# Patient Record
Sex: MALE | Race: WHITE | Employment: OTHER | URBAN - METROPOLITAN AREA
[De-identification: names, ages, dates, MRNs, and addresses within clinical notes are randomized per-mention and may not be internally consistent; named-entity substitution may affect disease eponyms.]

---

## 2017-07-11 ENCOUNTER — HOSPITAL ENCOUNTER (OUTPATIENT)
Dept: CT IMAGING | Age: 67
Discharge: HOME | End: 2017-07-11
Attending: UROLOGY
Payer: MEDICARE

## 2017-07-11 DIAGNOSIS — N20.0 KIDNEY STONE: ICD-10-CM

## 2017-07-11 PROCEDURE — 74176 CT ABD & PELVIS W/O CONTRAST: CPT

## 2018-08-01 ENCOUNTER — CLINICAL SUPPORT (OUTPATIENT)
Dept: CARDIOLOGY CLINIC | Age: 68
End: 2018-08-01

## 2018-08-01 ENCOUNTER — OFFICE VISIT (OUTPATIENT)
Dept: CARDIOLOGY CLINIC | Age: 68
End: 2018-08-01

## 2018-08-01 VITALS
HEART RATE: 73 BPM | WEIGHT: 250.9 LBS | OXYGEN SATURATION: 96 % | DIASTOLIC BLOOD PRESSURE: 80 MMHG | SYSTOLIC BLOOD PRESSURE: 138 MMHG | HEIGHT: 69 IN | RESPIRATION RATE: 16 BRPM | BODY MASS INDEX: 37.16 KG/M2

## 2018-08-01 DIAGNOSIS — R94.31 ABNORMAL EKG: ICD-10-CM

## 2018-08-01 DIAGNOSIS — I49.1 PAC (PREMATURE ATRIAL CONTRACTION): Primary | ICD-10-CM

## 2018-08-01 PROBLEM — E66.01 SEVERE OBESITY (BMI 35.0-39.9): Status: ACTIVE | Noted: 2018-08-01

## 2018-08-01 RX ORDER — LEVOTHYROXINE SODIUM 75 UG/1
TABLET ORAL
COMMUNITY

## 2018-08-01 RX ORDER — LISINOPRIL AND HYDROCHLOROTHIAZIDE 10; 12.5 MG/1; MG/1
TABLET ORAL DAILY
COMMUNITY

## 2018-08-01 RX ORDER — ASPIRIN 81 MG/1
TABLET ORAL DAILY
COMMUNITY
End: 2022-04-06 | Stop reason: CLARIF

## 2018-08-01 NOTE — PROGRESS NOTES
Royce Daily DNP, ANP-BC Subjective/HPI:  
 
Deepak Hood is a 79 y.o. male is here for the patient consultation regarding abnormal EKG. During general physical exam patient was appreciated to have irregularity in his heart rhythm, EKG performed by primary care showing sinus rhythm with frequent PACs. Patient is asymptomatic, denies lightheadedness dizziness shortness of breath palpitations. There is a family history of valve replacement, pacemaker placement and MI. 
 
 
PCP Provider Ian Espinosa MD 
Past Medical History:  
Diagnosis Date  Essential hypertension  Thyroid disease History reviewed. No pertinent surgical history. No Known Allergies Family History Problem Relation Age of Onset  Arrhythmia Mother  Pacemaker Mother  Cancer Father  Heart Attack Brother  Cancer Brother  Stroke Brother Current Outpatient Prescriptions Medication Sig  levothyroxine (SYNTHROID) 75 mcg tablet Take  by mouth Daily (before breakfast).  lisinopril-hydroCHLOROthiazide (PRINZIDE, ZESTORETIC) 10-12.5 mg per tablet Take  by mouth daily.  aspirin delayed-release 81 mg tablet Take  by mouth daily. No current facility-administered medications for this visit. Vitals:  
 08/01/18 1056 08/01/18 1107 BP: 130/78 138/80 Pulse: 73 Resp: 16 SpO2: 96% Weight: 250 lb 14.4 oz (113.8 kg) Height: 5' 9\" (1.753 m) Social History Social History  Marital status:  Spouse name: N/A  
 Number of children: N/A  
 Years of education: N/A Occupational History  Not on file. Social History Main Topics  Smoking status: Never Smoker  Smokeless tobacco: Current User Types: Chew Comment: tobacco chew  Alcohol use No  
 Drug use: No  
 Sexual activity: Not on file Other Topics Concern  Not on file Social History Narrative  No narrative on file I have reviewed the nurses notes, vitals, problem list, allergy list, medical history, family, social history and medications. Review of Symptoms: 
 
General: Pt denies excessive weight gain or loss. Pt is able to conduct ADL's HEENT: Denies blurred vision, headaches, epistaxis and difficulty swallowing. Respiratory: Denies shortness of breath, MANDEL, wheezing or stridor. Cardiovascular: Denies precordial pain, palpitations, edema or PND Gastrointestinal: Denies poor appetite, indigestion, abdominal pain or blood in stool Musculoskeletal: Denies pain or swelling from muscles or joints Neurologic: Denies tremor, paresthesias, or sensory motor disturbance Skin: Denies rash, itching or texture change. Physical Exam:   
 
General: Well developed, in no acute distress, cooperative and alert HEENT: No carotid bruits, no JVD, trach is midline. Neck Supple, PEERL, EOM intact. Heart:  Normal S1/S2 negative S3 or S4. Regular, no murmur, gallop or rub.  
Respiratory: Clear bilaterally x 4, no wheezing or rales Abdomen:   Soft, non-tender, no masses, bowel sounds are active.  
Extremities:  No edema, normal cap refill, no cyanosis, atraumatic. Neuro: A&Ox3, speech clear, gait stable. Skin: Skin color is normal. No rashes or lesions. Non diaphoretic Vascular: 2+ pulses symmetric in all extremities Cardiographics ECG: nsr with pac's No results found for this or any previous visit. Cardiology Labs: 
No results found for: CHOL, CHOLX, [facility], [address], [address], HDL, LDL, LDLC, DLDLP, TGLX, TRIGL, TRIGP, CHHD, CHHDX No results found for: NA, K, CL, CO2, AGAP, GLU, BUN, CREA, BUCR, GFRAA, GFRNA, CA, TBIL, TBILI, GPT, SGOT, AP, TP, ALB, GLOB, AGRAT, ALT Assessment: 
 
 Assessment:  
 
Diagnoses and all orders for this visit: 1. PAC (premature atrial contraction) 2. Abnormal EKG 
-     AMB POC EKG ROUTINE W/ 12 LEADS, INTER & REP 
 
 
  ICD-10-CM ICD-9-CM 1. PAC (premature atrial contraction) I49.1 427.61   
2.  Abnormal EKG R94.31 794.31 AMB POC EKG ROUTINE W/ 12 LEADS, INTER & REP Orders Placed This Encounter  AMB POC EKG ROUTINE W/ 12 LEADS, INTER & REP Order Specific Question:   Reason for Exam: Answer:   routine  levothyroxine (SYNTHROID) 75 mcg tablet Sig: Take  by mouth Daily (before breakfast).  lisinopril-hydroCHLOROthiazide (PRINZIDE, ZESTORETIC) 10-12.5 mg per tablet Sig: Take  by mouth daily.  aspirin delayed-release 81 mg tablet Sig: Take  by mouth daily. Plan:  
 
Patient is a 68-year-old male referred by primary care for abnormal ECG showing premature atrial contractions. Patient is asymptomatic. Will evaluate with Holter monitor to rule out PAF, echocardiogram.  
Hypertension: Controlled continue current medication Follow-up when testing complete. This note was created using voice recognition software. Despite editing, there may be syntax errors.   
 
Collette Barton MD

## 2018-08-01 NOTE — PROGRESS NOTES
Chief Complaint Patient presents with  New Patient  Abnormal EKG  
  per PCP 1. Have you been to the ER, urgent care clinic since your last visit? Hospitalized since your last visit? No 
 
2. Have you seen or consulted any other health care providers outside of the [address] [place] [address] since your last visit? Include any pap smears or colon screening. Yes When: pcp

## 2018-08-02 ENCOUNTER — CLINICAL SUPPORT (OUTPATIENT)
Dept: CARDIOLOGY CLINIC | Age: 68
End: 2018-08-02

## 2018-08-02 DIAGNOSIS — I49.1 PAC (PREMATURE ATRIAL CONTRACTION): ICD-10-CM

## 2018-08-02 DIAGNOSIS — I10 ESSENTIAL HYPERTENSION: Primary | ICD-10-CM

## 2018-08-07 ENCOUNTER — TELEPHONE (OUTPATIENT)
Dept: CARDIOLOGY CLINIC | Age: 68
End: 2018-08-07

## 2018-08-08 NOTE — TELEPHONE ENCOUNTER
Felisha Jurado, NP   You 20 hours ago (4:38 PM)                 ECHO normal  (Routing comment)                     Spoke with patient's spouse Lawanda(verified HIPPA)  Verified patient with 2 patient identifiers  Informed per Em Shipman NP ECHO normal

## 2018-08-15 ENCOUNTER — TELEPHONE (OUTPATIENT)
Dept: CARDIOLOGY CLINIC | Age: 68
End: 2018-08-15

## 2018-08-15 NOTE — PROGRESS NOTES
Spoke with Lawanda(verified HIPPA)  Verified patient with 2 patient identifiers  Informed ECHO ok per Dr Phnog Yap verbalized understanding

## 2018-08-21 ENCOUNTER — OFFICE VISIT (OUTPATIENT)
Dept: CARDIOLOGY CLINIC | Age: 68
End: 2018-08-21

## 2018-08-21 VITALS
WEIGHT: 241 LBS | HEART RATE: 59 BPM | RESPIRATION RATE: 18 BRPM | DIASTOLIC BLOOD PRESSURE: 88 MMHG | OXYGEN SATURATION: 94 % | BODY MASS INDEX: 35.7 KG/M2 | HEIGHT: 69 IN | SYSTOLIC BLOOD PRESSURE: 124 MMHG

## 2018-08-21 DIAGNOSIS — I10 ESSENTIAL HYPERTENSION: Primary | ICD-10-CM

## 2018-08-21 DIAGNOSIS — I49.1 PAC (PREMATURE ATRIAL CONTRACTION): ICD-10-CM

## 2018-08-21 NOTE — PROGRESS NOTES
1. Have you been to the ER, urgent care clinic since your last visit? Hospitalized since your last visit? NO.    2. Have you seen or consulted any other health care providers outside of the [address] [place] [address] since your last visit? Include any pap smears or colon screening. NO.       Chief Complaint   Patient presents with    Results     PT DENIES ANY CARDIAC SYMPTOMS

## 2018-08-21 NOTE — PROGRESS NOTES
Subjective/HPI:     Samir Cat is a 79 y.o. male. He was last seen one month ago. Today, Mr. Dario Olmos states he is doing well. 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 He has lost 9 lbs since his last appointment. He denies any dizziness. He further denies any exertional chest pain, dyspnea, palpitations, syncope, orthopnea, edema or paroxysmal nocturnal dyspnea. Of note, he reports a family history of valve replacement, pacemaker placement and MI.    PCP Provider  Meryle Motto, MD  Past Medical History:   Diagnosis Date    Essential hypertension     Thyroid disease       No past surgical history on file. No Known Allergies   Family History   Problem Relation Age of Onset    Arrhythmia Mother     Pacemaker Mother     Cancer Father     Heart Attack Brother     Cancer Brother     Stroke Brother       Current Outpatient Prescriptions   Medication Sig    fish oil-omega-3 fatty acids (FISH OIL) 300-500 mg cap Take  by mouth daily.  levothyroxine (SYNTHROID) 75 mcg tablet Take  by mouth Daily (before breakfast).  lisinopril-hydroCHLOROthiazide (PRINZIDE, ZESTORETIC) 10-12.5 mg per tablet Take  by mouth daily.  aspirin delayed-release 81 mg tablet Take  by mouth daily. No current facility-administered medications for this visit. Vitals:    08/21/18 1111   BP: 124/88   Pulse: (!) 59   Resp: 18   SpO2: 94%   Weight: 241 lb (109.3 kg)   Height: 5' 9\" (1.753 m)     Social History     Social History    Marital status:      Spouse name: N/A    Number of children: N/A    Years of education: N/A     Occupational History    Not on file.      Social History Main Topics    Smoking status: Never Smoker    Smokeless tobacco: Current User     Types: Chew      Comment: tobacco chew    Alcohol use No    Drug use: No    Sexual activity: Not on file     Other Topics Concern    Not on file     Social History Narrative       I have reviewed the nurses notes, vitals, problem list, allergy list, medical history, family, social history and medications. Review of Symptoms:    General: Pt denies excessive weight gain or loss. Pt is able to conduct ADL's  HEENT: Denies blurred vision, headaches, epistaxis and difficulty swallowing. Respiratory: Denies shortness of breath, MANDEL, wheezing or stridor. Cardiovascular: Denies precordial pain, palpitations, edema or PND  Gastrointestinal: Denies poor appetite, indigestion, abdominal pain or blood in stool  Musculoskeletal: Denies pain or swelling from muscles or joints  Neurologic: Denies tremor, paresthesias, or sensory motor disturbance  Skin: Denies rash, itching or texture change. Physical Exam:      General: Well developed, in no acute distress, cooperative and alert  HEENT: No carotid bruits, no JVD, trach is midline. Neck Supple, PEERL, EOM intact. Heart:  Normal S1/S2 negative S3 or S4. Regular, no murmur, gallop or rub.   Respiratory: Clear bilaterally x 4, no wheezing or rales  Abdomen:   Soft, non-tender, no masses, bowel sounds are active.   Extremities:  No edema, normal cap refill, no cyanosis, atraumatic. Neuro: A&Ox3, speech clear, gait stable. Skin: Skin color is normal. No rashes or lesions.  Non diaphoretic  Vascular: 2+ pulses symmetric in all extremities    Cardiographics      Results for orders placed or performed in visit on 08/01/18   CARDIAC HOLTER MONITOR, 24 HOURS    Narrative    ECG Monitor/24 hours, Complete    Reason for Holter Monitor   BRADYCARDIA/ PAC'S    Heartbeat    Slowest 41  Average 55  Fastest  74      Results:   Underlying Rhythm: Sinus bradycardia      Atrial Arrhythmias: premature atrial contractions; occasional, severe bradycardia          AV Conduction: normal    Ventricular Arrhythmias: premature ventricular contractions; rare     ST Segment Analysis:normal     Symptom Correlation:  No symptoms reported    Comment:   Sinus bradycardia, occasional pacs, no symptoms reported. Consider treadmill test to r/o chronotropic incompetence     Yu Motley MD, Basil Sam, [facility]                Cardiology Labs:  No results found for: CHOL, CHOLX, CHLST, CHOLV, 242431, HDL, LDL, LDLC, DLDLP, TGLX, TRIGL, TRIGP, CHHD, CHHDX    No results found for: NA, K, CL, CO2, AGAP, GLU, BUN, CREA, BUCR, GFRAA, GFRNA, CA, TBIL, TBILI, GPT, SGOT, AP, TP, ALB, GLOB, AGRAT, ALT        Assessment:     Assessment:     Diagnoses and all orders for this visit:    1. Essential hypertension    2. PAC (premature atrial contraction)        ICD-10-CM ICD-9-CM    1. Essential hypertension I10 401.9    2. PAC (premature atrial contraction) I49.1 427.61      Orders Placed This Encounter    fish oil-omega-3 fatty acids (FISH OIL) 300-500 mg cap     Sig: Take  by mouth daily. Plan:     Patient is a 73-year-old male. His BP and EKG are good today. His last Echo was normal. His Holter monitor did show occasional PACs and intermittent HRs in the 40s, but no episodes of A Fib. He denies any dizziness. Follow up PRN    Written by Laure Patricio, as dictated by Dr. Vasyl Myles.

## 2018-08-28 NOTE — MR AVS SNAPSHOT
[address] [facility] 
[phone] Patient: Jhonatan Shea MRN: [id number] IGH:[birth year] Visit Information Date & Time Provider Department Dept. Phone Encounter #  
 8/1/2018 11:00 AM Ben Melara, [address] [place] [address] [facility] [id number] Your Appointments 8/2/2018 12:30 PM  
ECHO CARDIOGRAMS 2D with ECHO, [facility]) Appt Note: p/DR JULIUS riggs ehco: waiting for orders; p/DR JULIUS riggs ehco: waiting for orders [address]  
[phone] [address] Upcoming Health Maintenance Date Due Hepatitis C Screening 1950 DTaP/Tdap/Td series (1 - Tdap) 2/26/1973 FOBT Q 1 YEAR AGE 50-75 2/26/2002 ZOSTER VACCINE AGE 60> 12/26/2011 GLAUCOMA SCREENING Q2Y 2/26/2017 Pneumococcal 65+ Low/Medium Risk (1 of 2 - PCV13) 2/26/2017 MEDICARE YEARLY EXAM 3/20/2018 Influenza Age 5 to Adult 8/1/2018 Allergies as of 8/1/2018  Review Complete On: 8/1/2018 By: Marika Reyes NP No Known Allergies Current Immunizations  Never Reviewed No immunizations on file. Not reviewed this visit You Were Diagnosed With   
  
 Codes Comments PAC (premature atrial contraction)    -  Primary ICD-10-CM: I49.1 ICD-9-CM: 427.61 Abnormal EKG     ICD-10-CM: R94.31 
ICD-9-CM: 794.31 Vitals BP Pulse Resp Height(growth percentile) Weight(growth percentile) SpO2  
 138/80 (BP 1 Location: Left arm, BP Patient Position: Sitting) 73 16 5' 9\" (1.753 m) 250 lb 14.4 oz (113.8 kg) 96% BMI Smoking Status 37.05 kg/m2 Never Smoker Vitals History BMI and BSA Data Body Mass Index Body Surface Area 37.05 kg/m 2 2.35 m 2 Your Updated Medication List  
  
   
 Detail Level: Zone This list is accurate as of 8/1/18 12:15 PM.  Always use your most recent med list.  
  
  
  
  
 aspirin delayed-release 81 mg tablet Take  by mouth daily. levothyroxine 75 mcg tablet Commonly known as:  SYNTHROID Take  by mouth Daily (before breakfast). lisinopril-hydroCHLOROthiazide 10-12.5 mg per tablet Commonly known as:  Janyth Huddle Take  by mouth daily. We Performed the Following AMB POC EKG ROUTINE W/ 12 LEADS, INTER & REP [61884 CPT(R)] Introducing [facility] SERVICES! Cristiana Travis introduces [employer] patient portal. Now you can access parts of your medical record, email your doctor's office, and request medication refills online. 1. In your internet browser, go to [email or web address] 2. Click on the First Time User? Click Here link in the Sign In box. You will see the New Member Sign Up page. 3. Enter your [employer] Access Code exactly as it appears below. You will not need to use this code after youve completed the sign-up process. If you do not sign up before the expiration date, you must request a new code. · [employer] Access Code: [email or web address] Expires: 10/30/2018 10:43 AM 
 
4. Enter the last four digits of your Social Security Number (xxxx) and Date of Birth (mm/dd/yyyy) as indicated and click Submit. You will be taken to the next sign-up page. 5. Create a [employer] ID. This will be your [employer] login ID and cannot be changed, so think of one that is secure and easy to remember. 6. Create a [employer] password. You can change your password at any time. 7. Enter your Password Reset Question and Answer. This can be used at a later time if you forget your password. 8. Enter your e-mail address. You will receive e-mail notification when new information is available in [address] 9. Click Sign Up. You can now view and download portions of your medical record. 10. Click the Download Summary menu link to download a portable copy of your medical information. If you have questions, please visit the Frequently Asked Questions section of the [employer] website. Remember, [employer] is NOT to be used for urgent needs. For medical emergencies, dial 911. Now available from your iPhone and Android! Please provide this summary of care documentation to your next provider. Your primary care clinician is listed as [facility]. If you have any questions after today's visit, please call [phone].

## 2019-09-12 ENCOUNTER — OFFICE VISIT (OUTPATIENT)
Dept: CARDIOLOGY CLINIC | Age: 69
End: 2019-09-12

## 2019-09-12 ENCOUNTER — CLINICAL SUPPORT (OUTPATIENT)
Dept: CARDIOLOGY CLINIC | Age: 69
End: 2019-09-12

## 2019-09-12 VITALS
HEIGHT: 69 IN | HEART RATE: 61 BPM | SYSTOLIC BLOOD PRESSURE: 124 MMHG | RESPIRATION RATE: 16 BRPM | DIASTOLIC BLOOD PRESSURE: 80 MMHG | WEIGHT: 255.8 LBS | BODY MASS INDEX: 37.89 KG/M2 | OXYGEN SATURATION: 97 %

## 2019-09-12 DIAGNOSIS — R00.1 SINUS BRADYCARDIA: ICD-10-CM

## 2019-09-12 DIAGNOSIS — I10 ESSENTIAL HYPERTENSION: ICD-10-CM

## 2019-09-12 DIAGNOSIS — I49.1 PAC (PREMATURE ATRIAL CONTRACTION): ICD-10-CM

## 2019-09-12 DIAGNOSIS — R42 DIZZINESS: ICD-10-CM

## 2019-09-12 DIAGNOSIS — R55 NEAR SYNCOPE: ICD-10-CM

## 2019-09-12 DIAGNOSIS — I49.9 IRREGULAR HEARTBEAT: Primary | ICD-10-CM

## 2019-09-12 NOTE — PROGRESS NOTES
Patient received a 2 week event monitor. Instructions given verbally as well as an instruction sheet. Pt verbalized understanding.     [facility] Event Monitoring

## 2019-09-12 NOTE — PROGRESS NOTES
Chief Complaint   Patient presents with    New Patient     referred by Dr Sejal Israel. Spouse question if sleep test needed    Dizziness     was seen by PCP Monday and noted afib. C/O off/on dizziness     1. Have you been to the ER, urgent care clinic since your last visit? Hospitalized since your last visit? No    2. Have you seen or consulted any other health care providers outside of the [address] [place] [address] since your last visit? Include any pap smears or colon screening.  Yes PCP

## 2019-09-12 NOTE — PROGRESS NOTES
Subjective: Rohan Melara is a 81 y.o. male is here for EP consult. The patient reports hx of PACs and AF last year. He completed echo and holter at that time. Strong fam hx of SSS, AF and CVA. He endorses progressive dizziness over the last few months with bending over and one near syncopal episode recently while waiting in line. Patient Active Problem List    Diagnosis Date Noted    Severe obesity (BMI 35.0-39.9) 08/01/2018      Najma Leblanc MD  Past Medical History:   Diagnosis Date    Essential hypertension     Thyroid disease       History reviewed. No pertinent surgical history. No Known Allergies   Family History   Problem Relation Age of Onset    Arrhythmia Mother     Pacemaker Mother     Cancer Father     Heart Attack Brother     Cancer Brother     Stroke Brother     negative for cardiac disease  Social History     Socioeconomic History    Marital status:      Spouse name: Not on file    Number of children: Not on file    Years of education: Not on file    Highest education level: Not on file   Tobacco Use    Smoking status: Never Smoker    Smokeless tobacco: Current User     Types: Chew    Tobacco comment: tobacco chew   Substance and Sexual Activity    Alcohol use: No    Drug use: No     Current Outpatient Medications   Medication Sig    fish oil-omega-3 fatty acids (FISH OIL) 300-500 mg cap Take  by mouth daily.  levothyroxine (SYNTHROID) 75 mcg tablet Take  by mouth Daily (before breakfast).  lisinopril-hydroCHLOROthiazide (PRINZIDE, ZESTORETIC) 10-12.5 mg per tablet Take  by mouth daily.  aspirin delayed-release 81 mg tablet Take  by mouth daily. No current facility-administered medications for this visit.        Vitals:    09/12/19 0918 09/12/19 0927 09/12/19 0928   BP: 128/80 130/80 124/80   Pulse: 61     Resp: 16     SpO2: 97%     Weight: 255 lb 12.8 oz (116 kg)     Height: 5' 9\" (1.753 m)         I have reviewed the nurses notes, vitals, problem list, allergy list, medical history, family, social history and medications. Review of Symptoms:    General: Pt denies excessive weight gain or loss. Pt is able to conduct ADL's  HEENT: Denies blurred vision, headaches, epistaxis and difficulty swallowing. Respiratory: Denies shortness of breath, MANDEL, wheezing or stridor. Cardiovascular: Denies precordial pain, palpitations, edema or PND  Gastrointestinal: Denies poor appetite, indigestion, abdominal pain or blood in stool  Urinary: Denies dysuria, pyuria  Musculoskeletal: Denies pain or swelling from muscles or joints  Neurologic: Denies tremor, paresthesias, or sensory motor disturbance  Skin: Denies rash, itching or texture change. Psych: Denies depression    Physical Exam:      General: Well developed, in no acute distress. HEENT: Eyes - PERRL, no jvd  Heart:  Normal S1/S2 negative S3 or S4. Regular, no murmur, gallop or rub. Respiratory: Clear bilaterally x 4, no wheezing or rales  Extremities:  No edema, normal cap refill, no cyanosis. Musculoskeletal: No clubbing  Neuro: A&Ox3, speech clear, gait stable. Skin: Skin color is normal. No rashes or lesions. Non diaphoretic  Vascular: 2+ pulses symmetric in all extremities    Cardiographics    Ekg: sinus bradycardia, occ PAC    Results for orders placed or performed in visit on 08/01/18   CARDIAC HOLTER MONITOR, 24 HOURS    Narrative    ECG Monitor/24 hours, Complete    Reason for Holter Monitor   BRADYCARDIA/ PAC'S    Heartbeat    Slowest 41  Average 55  Fastest  74      Results:   Underlying Rhythm: Sinus bradycardia      Atrial Arrhythmias: premature atrial contractions; occasional, severe bradycardia          AV Conduction: normal    Ventricular Arrhythmias: premature ventricular contractions; rare     ST Segment Analysis:normal     Symptom Correlation:  No symptoms reported    Comment:   Sinus bradycardia, occasional pacs, no symptoms reported.  Consider treadmill test to r/o chronotropic incompetence     Kennethally Hernandez MD, FACC, FHRS                No results found for: WBC, HGBPOC, HGB, HGBP, HCTPOC, HCT, PHCT, RBCH, PLT, MCV, HGBEXT, HCTEXT, PLTEXT, HGBEXT, HCTEXT, PLTEXT   No results found for: NA, K, CL, CO2, AGAP, GLU, BUN, CREA, BUCR, GFRAA, GFRNA, CA, TBIL, TBILI, GPT, SGOT, AP, TP, ALB, GLOB, AGRAT, ALT   No results found for: TSH, TSH2, TSH3, TSHP, TSHEXT, TSHEXT     Assessment:             ICD-10-CM ICD-9-CM    1. Irregular heartbeat I49.9 427.9 AMB POC EKG ROUTINE W/ 12 LEADS, INTER & REP      ECHO ADULT COMPLETE      ECG EVENT RECORD MONITORING SET-UP   2. Essential hypertension I10 401.9 ECHO ADULT COMPLETE      ECG EVENT RECORD MONITORING SET-UP   3. PAC (premature atrial contraction) I49.1 427.61 ECHO ADULT COMPLETE      ECG EVENT RECORD MONITORING SET-UP   4. BMI 37.0-37.9, adult Z68.37 V85.37 ECHO ADULT COMPLETE      ECG EVENT RECORD MONITORING SET-UP   5. Near syncope R55 780.2 ECHO ADULT COMPLETE      ECG EVENT RECORD MONITORING SET-UP   6. Dizziness R42 780.4 ECHO ADULT COMPLETE      ECG EVENT RECORD MONITORING SET-UP   7. Sinus bradycardia R00.1 427.89      Orders Placed This Encounter    AMB POC EKG ROUTINE W/ 12 LEADS, INTER & REP     Order Specific Question:   Reason for Exam:     Answer:   routine    ECG EVENT RECORD MONITORING SET-UP     Standing Status:   Future     Standing Expiration Date:   9/12/2020     Order Specific Question:   Reason for Exam:     Answer:   near synope, PACs, bradycardia        Plan:     Mr Cele Matthews presents for EP evaluation. Monitor last year with Sinus bradycardia, occasional pacs, no symptoms reported. With recent complaints of dizziness and near syncope, will repeat his testing (echo and event). Follow up in 3-4 weeks. Continue medical management for obesity, HTN and PACs. Thank you for allowing me to participate in Valdez Morin 's care. Vangie Gale NP    Patient seen and examined. All pertinent data reviewed. I have reviewed detailed note as outlined by Amanda Phillips NP. Case discussed with Nursing/medical assistant staff and Amanda Phillips NP. Plans as outlined. Hx of near syncope and monitor with eleanor in the 40s without symptoms. More episodes of dizziness. Will obtain an event monitor and echo. Cont med rx for htn and pacs. F/u post testing.     Etelvina Gee MD, Arianna Pedraza

## 2019-10-01 ENCOUNTER — TELEPHONE (OUTPATIENT)
Dept: CARDIOLOGY CLINIC | Age: 69
End: 2019-10-01

## 2019-10-10 ENCOUNTER — OFFICE VISIT (OUTPATIENT)
Dept: CARDIOLOGY CLINIC | Age: 69
End: 2019-10-10

## 2019-10-10 VITALS
OXYGEN SATURATION: 97 % | BODY MASS INDEX: 37.98 KG/M2 | SYSTOLIC BLOOD PRESSURE: 136 MMHG | DIASTOLIC BLOOD PRESSURE: 78 MMHG | HEART RATE: 64 BPM | HEIGHT: 69 IN | RESPIRATION RATE: 18 BRPM | WEIGHT: 256.4 LBS

## 2019-10-10 DIAGNOSIS — R00.1 SINUS BRADYCARDIA: ICD-10-CM

## 2019-10-10 DIAGNOSIS — R55 NEAR SYNCOPE: ICD-10-CM

## 2019-10-10 DIAGNOSIS — I10 ESSENTIAL HYPERTENSION: ICD-10-CM

## 2019-10-10 DIAGNOSIS — I49.1 PAC (PREMATURE ATRIAL CONTRACTION): Primary | ICD-10-CM

## 2019-10-10 RX ORDER — CHOLECALCIFEROL (VITAMIN D3) 125 MCG
CAPSULE ORAL DAILY
COMMUNITY

## 2019-10-10 NOTE — PROGRESS NOTES
1. Have you been to the ER, urgent care clinic since your last visit? Hospitalized since your last visit? No    2. Have you seen or consulted any other health care providers outside of the [address] [place] [address] since your last visit? Include any pap smears or colon screening. No    Chief Complaint   Patient presents with    Results     Discuss echo and EM results. Denied cardiac symptoms.

## 2019-10-10 NOTE — PROGRESS NOTES
Subjective: Ana Rosa Vasquez is a 81 y.o. male is here for follow up s/p event monitor and echocardiogram for palpitations/dizziness. He continues to have some dizziness; however, this usually positional when working outside. The patient denies chest pain/ shortness of breath, orthopnea, PND, LE edema, palpitations, syncope, or fatigue. Patient Active Problem List    Diagnosis Date Noted    Severe obesity (BMI 35.0-39.9) 08/01/2018      Yancy Teague MD  Past Medical History:   Diagnosis Date    Essential hypertension     Thyroid disease       History reviewed. No pertinent surgical history. No Known Allergies   Family History   Problem Relation Age of Onset    Arrhythmia Mother     Pacemaker Mother     Cancer Father     Heart Attack Brother     Cancer Brother     Stroke Brother     negative for cardiac disease  [unfilled]  Current Outpatient Medications   Medication Sig    cholecalciferol, vitamin D3, (VITAMIN D3) 2,000 unit tab Take  by mouth daily.  fish oil-omega-3 fatty acids (FISH OIL) 300-500 mg cap Take  by mouth daily.  levothyroxine (SYNTHROID) 75 mcg tablet Take  by mouth Daily (before breakfast).  lisinopril-hydroCHLOROthiazide (PRINZIDE, ZESTORETIC) 10-12.5 mg per tablet Take  by mouth daily.  aspirin delayed-release 81 mg tablet Take  by mouth daily. No current facility-administered medications for this visit. Vitals:    10/10/19 0849   BP: 136/78   Pulse: 64   Resp: 18   SpO2: 97%   Weight: 256 lb 6.4 oz (116.3 kg)   Height: 5' 9\" (1.753 m)       I have reviewed the nurses notes, vitals, problem list, allergy list, medical history, family, social history and medications. Review of Symptoms:    General: +weakness, Pt denies excessive weight gain or loss. Pt is able to conduct ADL's  HEENT: Denies blurred vision, headaches, epistaxis and difficulty swallowing. Respiratory: Denies shortness of breath, MANDEL, wheezing or stridor.   Cardiovascular: Denies precordial pain, palpitations, edema or PND  Gastrointestinal: Denies poor appetite, indigestion, abdominal pain or blood in stool  Urinary: Denies dysuria, pyuria  Musculoskeletal: Denies pain or swelling from muscles or joints  Neurologic: Denies tremor, paresthesias, or sensory motor disturbance  Skin: Denies rash, itching or texture change. Psych: Denies depression      Physical Exam:      General: Well developed, in no acute distress. HEENT: Eyes - PERRL, no jvd  Heart:  Normal S1/S2 negative S3 or S4. Regular, no murmur, gallop or rub. Respiratory: Clear bilaterally x 4, no wheezing or rales  Abdomen:   Soft, non-tender, bowel sounds are active. Extremities:  No edema, normal cap refill, no cyanosis. Musculoskeletal: No clubbing  Neuro: A&Ox3, speech clear, gait stable. Skin: Skin color is normal. No rashes or lesions. Non diaphoretic  Vascular: 2+ pulses symmetric in all extremities    Cardiographics    Ekg:     Results for orders placed or performed in visit on 08/01/18   CARDIAC HOLTER MONITOR, 24 HOURS    Narrative    ECG Monitor/24 hours, Complete    Reason for Holter Monitor   BRADYCARDIA/ PAC'S    Heartbeat    Slowest 41  Average 55  Fastest  74      Results:   Underlying Rhythm: Sinus bradycardia      Atrial Arrhythmias: premature atrial contractions; occasional, severe bradycardia          AV Conduction: normal    Ventricular Arrhythmias: premature ventricular contractions; rare     ST Segment Analysis:normal     Symptom Correlation:  No symptoms reported    Comment:   Sinus bradycardia, occasional pacs, no symptoms reported.  Consider treadmill test to r/o chronotropic incompetence     Baylee Peterson MD, [facility], [place]                No results found for: WBC, HGBPOC, HGB, HGBP, HCTPOC, HCT, PHCT, RBCH, PLT, MCV, HGBEXT, HCTEXT, PLTEXT   No results found for: NA, K, CL, CO2, AGAP, GLU, BUN, CREA, BUCR, GFRAA, GFRNA, CA, TBIL, TBILI, GPT, SGOT, AP, TP, ALB, GLOB, AGRAT, ALT Assessment:     Assessment:        ICD-10-CM ICD-9-CM    1. PAC (premature atrial contraction) I49.1 427.61    2. Essential hypertension I10 401.9    3. Near syncope R55 780.2    4. BMI 37.0-37.9, adult Z68.37 V85.37    5. Sinus bradycardia R00.1 427.89      Orders Placed This Encounter    cholecalciferol, vitamin D3, (VITAMIN D3) 2,000 unit tab     Sig: Take  by mouth daily. Plan:   Mr Marianne Ruiz presents for follow up s/p monitor for palpitations. Recent two week monitor demonstrated sinus rhythm with PACs throughout. He indicated slight dizziness during times of sinus rhythm as well as during times of PACs. Do not suspect PACs are driving his symptoms. Echocardiogram demonstrated preserved LV function. Discussed weight loss, and improved hydration, continued monitoring of symptoms. He will follow up as needed for progression of symptoms. Continue medical management for HTN, Syncope, bradycardia. Thank you for allowing me to participate in [facility] 's care.     Yarely Shetty NP

## 2022-04-05 ENCOUNTER — ANESTHESIA EVENT (OUTPATIENT)
Dept: ENDOSCOPY | Age: 72
End: 2022-04-05
Payer: MEDICARE

## 2022-04-05 NOTE — ANESTHESIA PREPROCEDURE EVALUATION
Relevant Problems   ENDOCRINE   (+) Severe obesity (BMI 35.0-39. 9)       Anesthetic History   No history of anesthetic complications            Review of Systems / Medical History  Patient summary reviewed, nursing notes reviewed and pertinent labs reviewed    Pulmonary  Within defined limits                 Neuro/Psych   Within defined limits           Cardiovascular    Hypertension              Exercise tolerance: >4 METS  Comments: TTE (9/27/19): Left Ventricle: Normal cavity size and systolic function (ejection fraction normal). Mild concentric hypertrophy. Estimated left ventricular ejection fraction is 61 - 65%. No regional wall motion abnormality noted. Age-appropriate left ventricular diastolic function. ECG (9/12/19):   Sinus  Bradycardia  - occasional PAC     # PACs = 1.  WITHIN NORMAL LIMITS   GI/Hepatic/Renal               Comments: Screening colonoscopy (4/7/22)  FHx colon polyps Endo/Other      Hypothyroidism  Morbid obesity     Other Findings            Physical Exam    Airway  Mallampati: III  TM Distance: > 6 cm  Neck ROM: normal range of motion   Mouth opening: Normal     Cardiovascular  Regular rate and rhythm,  S1 and S2 normal,  no murmur, click, rub, or gallop             Dental         Pulmonary  Breath sounds clear to auscultation               Abdominal  GI exam deferred       Other Findings            Anesthetic Plan    ASA: 3  Anesthesia type: MAC          Induction: Intravenous  Anesthetic plan and risks discussed with: Patient

## 2022-04-07 ENCOUNTER — HOSPITAL ENCOUNTER (OUTPATIENT)
Age: 72
Setting detail: OUTPATIENT SURGERY
Discharge: HOME | End: 2022-04-07
Attending: SPECIALIST | Admitting: SPECIALIST
Payer: MEDICARE

## 2022-04-07 ENCOUNTER — ANESTHESIA (OUTPATIENT)
Dept: ENDOSCOPY | Age: 72
End: 2022-04-07
Payer: MEDICARE

## 2022-04-07 VITALS
DIASTOLIC BLOOD PRESSURE: 78 MMHG | OXYGEN SATURATION: 95 % | TEMPERATURE: 98.4 F | WEIGHT: 241.5 LBS | RESPIRATION RATE: 14 BRPM | HEART RATE: 63 BPM | HEIGHT: 69 IN | BODY MASS INDEX: 35.77 KG/M2 | SYSTOLIC BLOOD PRESSURE: 149 MMHG

## 2022-04-07 PROCEDURE — 77030003657 HC NDL SCLER BSC -B: Performed by: SPECIALIST

## 2022-04-07 PROCEDURE — 74011250637 HC RX REV CODE- 250/637: Performed by: SPECIALIST

## 2022-04-07 PROCEDURE — 76060000032 HC ANESTHESIA 0.5 TO 1 HR: Performed by: SPECIALIST

## 2022-04-07 PROCEDURE — 2709999900 HC NON-CHARGEABLE SUPPLY: Performed by: SPECIALIST

## 2022-04-07 PROCEDURE — 76040000007: Performed by: SPECIALIST

## 2022-04-07 PROCEDURE — 77030010936 HC CLP LIG BSC -C: Performed by: SPECIALIST

## 2022-04-07 PROCEDURE — 74011250636 HC RX REV CODE- 250/636: Performed by: NURSE ANESTHETIST, CERTIFIED REGISTERED

## 2022-04-07 PROCEDURE — 77030013990 HC SNR POLYP ACUSNR COOK -B: Performed by: SPECIALIST

## 2022-04-07 PROCEDURE — 77030013992 HC SNR POLYP ENDOSC BSC -B: Performed by: SPECIALIST

## 2022-04-07 PROCEDURE — 74011000250 HC RX REV CODE- 250: Performed by: NURSE ANESTHETIST, CERTIFIED REGISTERED

## 2022-04-07 PROCEDURE — 74011250636 HC RX REV CODE- 250/636: Performed by: SPECIALIST

## 2022-04-07 PROCEDURE — 77030037345 HC NET FB ENDO RETVR RESCUNT DISP BSC -B: Performed by: SPECIALIST

## 2022-04-07 PROCEDURE — 88305 TISSUE EXAM BY PATHOLOGIST: CPT

## 2022-04-07 DEVICE — WORKING LENGTH 235CM, WORKING CHANNEL 2.8MM
Type: IMPLANTABLE DEVICE | Site: ASCENDING COLON | Status: FUNCTIONAL
Brand: RESOLUTION 360 CLIP

## 2022-04-07 RX ORDER — PROPOFOL 10 MG/ML
INJECTION, EMULSION INTRAVENOUS AS NEEDED
Status: DISCONTINUED | OUTPATIENT
Start: 2022-04-07 | End: 2022-04-07 | Stop reason: HOSPADM

## 2022-04-07 RX ORDER — SODIUM CHLORIDE 0.9 % (FLUSH) 0.9 %
5-40 SYRINGE (ML) INJECTION AS NEEDED
Status: DISCONTINUED | OUTPATIENT
Start: 2022-04-07 | End: 2022-04-07 | Stop reason: HOSPADM

## 2022-04-07 RX ORDER — SODIUM CHLORIDE 0.9 % (FLUSH) 0.9 %
5-40 SYRINGE (ML) INJECTION EVERY 8 HOURS
Status: DISCONTINUED | OUTPATIENT
Start: 2022-04-07 | End: 2022-04-07 | Stop reason: HOSPADM

## 2022-04-07 RX ORDER — SODIUM CHLORIDE 9 MG/ML
50 INJECTION, SOLUTION INTRAVENOUS CONTINUOUS
Status: DISCONTINUED | OUTPATIENT
Start: 2022-04-07 | End: 2022-04-07 | Stop reason: HOSPADM

## 2022-04-07 RX ORDER — LIDOCAINE HYDROCHLORIDE 20 MG/ML
INJECTION, SOLUTION EPIDURAL; INFILTRATION; INTRACAUDAL; PERINEURAL AS NEEDED
Status: DISCONTINUED | OUTPATIENT
Start: 2022-04-07 | End: 2022-04-07 | Stop reason: HOSPADM

## 2022-04-07 RX ORDER — DEXTROMETHORPHAN/PSEUDOEPHED 2.5-7.5/.8
1.2 DROPS ORAL
Status: DISCONTINUED | OUTPATIENT
Start: 2022-04-07 | End: 2022-04-07 | Stop reason: HOSPADM

## 2022-04-07 RX ADMIN — PROPOFOL 100 MG: 10 INJECTION, EMULSION INTRAVENOUS at 09:24

## 2022-04-07 RX ADMIN — SODIUM CHLORIDE 50 ML/HR: 9 INJECTION, SOLUTION INTRAVENOUS at 09:00

## 2022-04-07 RX ADMIN — PROPOFOL 100 MG: 10 INJECTION, EMULSION INTRAVENOUS at 09:17

## 2022-04-07 RX ADMIN — LIDOCAINE HYDROCHLORIDE 40 MG: 20 INJECTION, SOLUTION EPIDURAL; INFILTRATION; INTRACAUDAL; PERINEURAL at 09:06

## 2022-04-07 RX ADMIN — PROPOFOL 100 MG: 10 INJECTION, EMULSION INTRAVENOUS at 09:28

## 2022-04-07 RX ADMIN — Medication 80 MG: at 09:17

## 2022-04-07 RX ADMIN — PROPOFOL 100 MG: 10 INJECTION, EMULSION INTRAVENOUS at 09:13

## 2022-04-07 RX ADMIN — PROPOFOL 100 MG: 10 INJECTION, EMULSION INTRAVENOUS at 09:08

## 2022-04-07 RX ADMIN — PROPOFOL 50 MG: 10 INJECTION, EMULSION INTRAVENOUS at 09:32

## 2022-04-07 NOTE — H&P
Gastroenterology Outpatient History and Physical    Patient: Herlinda Price    Physician: Marquita Fontenot MD    Vital Signs: Blood pressure (!) 196/91, pulse 72, temperature 98.2 °F (36.8 °C), resp. rate 13, height 5' 9\" (1.753 m), weight 109.5 kg (241 lb 8 oz), SpO2 96 %. Allergies: No Known Allergies    Chief Complaint: CRC screening    History of Present Illness: 80 yo WM for screening colonoscopy    Justification for Procedure: above    History:  Past Medical History:   Diagnosis Date    Essential hypertension     Thyroid disease       Past Surgical History:   Procedure Laterality Date    HX HERNIA REPAIR        Social History     Socioeconomic History    Marital status:    Tobacco Use    Smoking status: Never Smoker    Smokeless tobacco: Former User     Types: Chew    Tobacco comment: tobacco chew   Substance and Sexual Activity    Alcohol use: Not Currently     Alcohol/week: 2.0 standard drinks     Types: 2 Cans of beer per week     Comment: 2 beers a week    Drug use: No      Family History   Problem Relation Age of Onset    Arrhythmia Mother     Pacemaker Mother     Cancer Father     Heart Attack Brother     Cancer Brother     Stroke Brother        Medications:   Prior to Admission medications    Medication Sig Start Date End Date Taking? Authorizing Provider   cholecalciferol, vitamin D3, (VITAMIN D3) 2,000 unit tab Take  by mouth daily. Yes Provider, Historical   levothyroxine (SYNTHROID) 75 mcg tablet Take  by mouth Daily (before breakfast). Yes Provider, Historical   lisinopril-hydroCHLOROthiazide (PRINZIDE, ZESTORETIC) 10-12.5 mg per tablet Take  by mouth daily. Yes Provider, Historical       Physical Exam:   General: alert, no distress   HEENT: Head: Normocephalic, no lesions, without obvious abnormality.    Heart: regular rate and rhythm, S1, S2 normal, no murmur, click, rub or gallop   Lungs: chest clear, no wheezing, rales, normal symmetric air entry   Abdominal: soft, NT/ND + BS   Neurological: Grossly normal   Extremities: extremities normal, atraumatic, no cyanosis or edema     Findings/Diagnosis: CRC screening    Plan of Care/Planned Procedure: Colonoscopy

## 2022-04-07 NOTE — DISCHARGE INSTRUCTIONS
Chris Diaz  [id number]  1950    COLON DISCHARGE INSTRUCTIONS  Discomfort:  Redness at IV site- apply warm compress to area; if redness or soreness persist- contact your physician  There may be a slight amount of blood passed from the rectum  Gaseous discomfort- walking, belching will help relieve any discomfort  You may not operate a vehicle for 12 hours  You may not engage in an occupation involving machinery or appliances for rest of today  You may not drink alcoholic beverages for at least 12 hours  Avoid making any critical decisions for at least 24 hour  DIET:   Regular diet. - however -  remember your colon is empty and a heavy meal will produce gas. Avoid these foods:  vegetables, fried / greasy foods, carbonated drinks for today. MEDICATIONS:        Regarding Aspirin or Nonsteroidal medications, please see below. ACTIVITY:  You may resume your normal daily activities it is recommended that you spend the remainder of the day resting -  avoid any strenuous activity. CALL M.D. ANY SIGN OF:  Increasing pain, nausea, vomiting  Abdominal distension (swelling)  New increased bleeding (oral or rectal)  Fever (chills)  Pain in chest area  Bloody discharge from nose or mouth  Shortness of breath    You may not  take any Advil, Aspirin, Ibuprofen, Motrin, Aleve, or Goodys for 10 days, ONLY  Tylenol as needed for pain. Follow-up Instructions:   Call Dr. Heriberto Bush for results of procedure / biopsy in 4-5 days at telephone #  [phone]              Repeat Colonoscopy in 1 year due to larger sized colon polyps removed today.

## 2022-04-07 NOTE — ANESTHESIA POSTPROCEDURE EVALUATION
Procedure(s):  COLONOSCOPY  RESOLUTION CLIP-  x4  INJECTION  ENDOSCOPIC POLYPECTOMY. MAC    Anesthesia Post Evaluation        Patient location during evaluation: PACU  Note status: Adequate. Level of consciousness: responsive to verbal stimuli and sleepy but conscious  Pain management: satisfactory to patient  Airway patency: patent  Anesthetic complications: no  Cardiovascular status: acceptable  Respiratory status: acceptable  Hydration status: acceptable  Comments: +Post-Anesthesia Evaluation and Assessment    Patient: Samia Kohler MRN: [id number]  SSN: [id number]   YOB: 1950  Age: 81 y.o. Sex: male      Cardiovascular Function/Vital Signs    /72   Pulse 67   Temp 36.8 °C (98.2 °F)   Resp 18   Ht 5' 9\" (1.753 m)   Wt 109.5 kg (241 lb 8 oz)   SpO2 93%   BMI 35.66 kg/m²     Patient is status post Procedure(s):  COLONOSCOPY  RESOLUTION CLIP-  x4  INJECTION  ENDOSCOPIC POLYPECTOMY. Nausea/Vomiting: Controlled. Postoperative hydration reviewed and adequate. Pain:  Pain Scale 1: Numeric (0 - 10) (04/07/22 0856)  Pain Intensity 1: 0 (04/07/22 0856)   Managed. Neurological Status: At baseline. Mental Status and Level of Consciousness: Arousable. Pulmonary Status:   O2 Device: None (04/07/22 0942)   Adequate oxygenation and airway patent. Complications related to anesthesia: None    Post-anesthesia assessment completed. No concerns. Signed By: Ravi Eugene MD    4/7/2022  Post anesthesia nausea and vomiting:  controlled      INITIAL Post-op Vital signs: No vitals data found for the desired time range.

## 2022-04-07 NOTE — PROGRESS NOTES
[facility]  1950  [id number]    Situation:  Verbal report received from: [place], RN  Procedure: Procedure(s):  COLONOSCOPY  RESOLUTION CLIP-  x4  INJECTION  ENDOSCOPIC POLYPECTOMY    Background:    Preoperative diagnosis: SCREENING/FAMILY HX COLON POLYPS  Postoperative diagnosis: diverticulosis, hemorrhoids, polyps    :  Dr. Vamsi Parham  Assistant(s): Endoscopy Technician-1: Katlyn Rodriguez  Endoscopy RN-1: Mahin Nugent  Endoscopy RN-2: Jeannette Bacon RN    Specimens:   ID Type Source Tests Collected by Time Destination   1 : polyps Preservative Colon, Ascending  Risa Mckeon MD 4/7/2022 0915 Pathology   2 : polyp Preservative Rectum  Risa Mckeon MD 4/7/2022 1314 Pathology     H. Pylori  no    Assessment:  Intra-procedure medications   Anesthesia gave intra-procedure sedation and medications, see anesthesia flow sheet yes    Intravenous fluids: NS@ KVO     Vital signs stable yes    Abdominal assessment: round and soft yes    Recommendation:  Discharge patient per MD order yes  Return to floor n/a  Family or Ellan Bride, wife  Permission to share finding with family or friend yes

## 2022-04-07 NOTE — PERIOP NOTES
Endoscope was pre-cleaned at bedside immediately following procedure by SAHIL Hinson     Medications     lidocaine (PF) 2% (mg)     Date/Time Rate/Dose/Volume Action Route Admin User Audit       04/07/22  0906 40 mg Given IntraVENous Chance Scott, JAYA            propofol 10 mg/mL (mg)     Date/Time Rate/Dose/Volume Action Route Admin User Audit       04/07/22  0908 100 mg Given IntraVENous Clarence Hallowell, CRNA       0913 100 mg Given IntraVENous Clarence Hallowell, CRNA       3401 100 mg Given IntraVENous Clarence Hallowell, CRNA       9125 100 mg Given IntraVENous Clarence Hallowell, CRNA       3800 100 mg Given IntraVENous Clarence Hallowell, CRNA       0932 50 mg Given IntraVENous Clarence Hallowell, CRNA            0.9% sodium chloride infusion (mL/hr)     Date/Time Rate/Dose/Volume Action Route Admin User Audit       04/07/22  0907 50 mL/hr Rate Verify IntraVENous Clarence Hallowell, CRNA

## 2022-04-07 NOTE — PROCEDURES
Colonoscopy Procedure Note    Indications:   Screening colonoscopy    Referring Physician: Jarod Cooper MD  Anesthesia/Sedation: MAC anesthesia Propofol  Endoscopist:  Dr. Larry Rubalcava    Procedure in Detail:  Informed consent was obtained for the procedure, including sedation. Risks of perforation, hemorrhage, adverse drug reaction, and aspiration were discussed. The patient was placed in the left lateral decubitus position. Based on the pre-procedure assessment, including review of the patient's medical history, medications, allergies, and review of systems, he had been deemed to be an appropriate candidate for moderate sedation; he was therefore sedated with the medications listed above. The patient was monitored continuously with ECG tracing, pulse oximetry, blood pressure monitoring, and direct observations. A rectal examination was performed. The [id number] was inserted into the rectum and advanced under direct vision to the cecum, which was identified by the ileocecal valve and appendiceal orifice. The quality of the colonic preparation was adequate. A careful inspection was made as the colonoscope was withdrawn, including a retroflexed view of the rectum; findings and interventions are described below. Appropriate photodocumentation was obtained. Findings:   1. Scope advanced to the cecum. 2.  There was diffuse moderate severity diverticulosis in the sigmoid, descending and transverse colon. 3.  (2) sessile adjacent polyps between folds in the proximal ascending colon: one polyp appeared more villous and was approximately 1.2 cm and was removed in one piece with larger duckbill snare. We placed (2) clips at polypectomy base. Then the other adjacent polyp had appearance of serrated adenoma and was 1 cm in size. This was also removed in one piece with hot large duckbill snare. (2) clips placed at polypectomy base here.   Then we injected debo ink: total of 10 ml (1/2 at each polypectomy site that were adjacent to each other). Using a eckert net we retrieved both polyps. 4.  Small 3 mm rectal polyp s/p cold snare removal.  5.  Small internal hemorrhoids. Therapies:  See above    Specimen: see above    Complications: None were encountered during the procedure. EBL: < 20 ml.     Recommendations:   -f/u path  -repeat colonoscopy in 1 year due to complex polypectomies     Signed By: Majo Rubi MD                        April 7, 2022

## 2023-04-03 ENCOUNTER — ANESTHESIA EVENT (OUTPATIENT)
Dept: ENDOSCOPY | Age: 73
End: 2023-04-03
Payer: MEDICARE

## 2023-04-03 ENCOUNTER — HOSPITAL ENCOUNTER (OUTPATIENT)
Age: 73
Setting detail: OUTPATIENT SURGERY
Discharge: HOME | End: 2023-04-03
Attending: SPECIALIST | Admitting: SPECIALIST
Payer: MEDICARE

## 2023-04-03 ENCOUNTER — ANESTHESIA (OUTPATIENT)
Dept: ENDOSCOPY | Age: 73
End: 2023-04-03
Payer: MEDICARE

## 2023-04-03 PROCEDURE — 77030013990 HC SNR POLYP ACUSNR COOK -B: Performed by: SPECIALIST

## 2023-04-03 PROCEDURE — 74011000250 HC RX REV CODE- 250: Performed by: ANESTHESIOLOGY

## 2023-04-03 PROCEDURE — 2709999900 HC NON-CHARGEABLE SUPPLY: Performed by: SPECIALIST

## 2023-04-03 PROCEDURE — 88305 TISSUE EXAM BY PATHOLOGIST: CPT

## 2023-04-03 PROCEDURE — 74011250636 HC RX REV CODE- 250/636: Performed by: SPECIALIST

## 2023-04-03 PROCEDURE — 76040000019: Performed by: SPECIALIST

## 2023-04-03 PROCEDURE — 76060000031 HC ANESTHESIA FIRST 0.5 HR: Performed by: SPECIALIST

## 2023-04-03 PROCEDURE — 74011250636 HC RX REV CODE- 250/636: Performed by: ANESTHESIOLOGY

## 2023-04-03 PROCEDURE — 77030013992 HC SNR POLYP ENDOSC BSC -B: Performed by: SPECIALIST

## 2023-04-03 RX ORDER — SODIUM CHLORIDE 9 MG/ML
50 INJECTION, SOLUTION INTRAVENOUS CONTINUOUS
Status: DISCONTINUED | OUTPATIENT
Start: 2023-04-03 | End: 2023-04-03 | Stop reason: HOSPADM

## 2023-04-03 RX ORDER — SODIUM CHLORIDE 0.9 % (FLUSH) 0.9 %
5-40 SYRINGE (ML) INJECTION EVERY 8 HOURS
Status: DISCONTINUED | OUTPATIENT
Start: 2023-04-03 | End: 2023-04-03 | Stop reason: HOSPADM

## 2023-04-03 RX ORDER — PROPOFOL 10 MG/ML
INJECTION, EMULSION INTRAVENOUS AS NEEDED
Status: DISCONTINUED | OUTPATIENT
Start: 2023-04-03 | End: 2023-04-03 | Stop reason: HOSPADM

## 2023-04-03 RX ORDER — SODIUM CHLORIDE 0.9 % (FLUSH) 0.9 %
5-40 SYRINGE (ML) INJECTION AS NEEDED
Status: DISCONTINUED | OUTPATIENT
Start: 2023-04-03 | End: 2023-04-03 | Stop reason: HOSPADM

## 2023-04-03 RX ORDER — GLYCOPYRROLATE 0.2 MG/ML
INJECTION INTRAMUSCULAR; INTRAVENOUS AS NEEDED
Status: DISCONTINUED | OUTPATIENT
Start: 2023-04-03 | End: 2023-04-03 | Stop reason: HOSPADM

## 2023-04-03 RX ORDER — DEXTROMETHORPHAN/PSEUDOEPHED 2.5-7.5/.8
1.2 DROPS ORAL
Status: DISCONTINUED | OUTPATIENT
Start: 2023-04-03 | End: 2023-04-03 | Stop reason: HOSPADM

## 2023-04-03 RX ORDER — LIDOCAINE HYDROCHLORIDE 20 MG/ML
INJECTION, SOLUTION EPIDURAL; INFILTRATION; INTRACAUDAL; PERINEURAL AS NEEDED
Status: DISCONTINUED | OUTPATIENT
Start: 2023-04-03 | End: 2023-04-03 | Stop reason: HOSPADM

## 2023-04-03 RX ADMIN — SODIUM CHLORIDE 50 ML/HR: 9 INJECTION, SOLUTION INTRAVENOUS at 06:49

## 2023-04-03 RX ADMIN — GLYCOPYRROLATE 0.2 MG: 0.2 INJECTION, SOLUTION INTRAMUSCULAR; INTRAVENOUS at 07:09

## 2023-04-03 RX ADMIN — LIDOCAINE HYDROCHLORIDE 40 MG: 20 INJECTION, SOLUTION EPIDURAL; INFILTRATION; INTRACAUDAL; PERINEURAL at 07:09

## 2023-04-03 RX ADMIN — PROPOFOL 280 MG: 10 INJECTION, EMULSION INTRAVENOUS at 07:30

## 2023-04-03 NOTE — ANESTHESIA POSTPROCEDURE EVALUATION
Procedure(s):  COLONOSCOPY  ENDOSCOPIC POLYPECTOMY. total IV anesthesia    Anesthesia Post Evaluation        Patient location during evaluation: PACU  Note status: Adequate. Level of consciousness: responsive to verbal stimuli and sleepy but conscious  Pain management: satisfactory to patient  Airway patency: patent  Anesthetic complications: no  Cardiovascular status: acceptable  Respiratory status: acceptable  Hydration status: acceptable  Comments: +Post-Anesthesia Evaluation and Assessment    Patient: Dick Hernandez MRN: [id number]  SSN: [id number]   YOB: 1950  Age: 72 y.o. Sex: male      Cardiovascular Function/Vital Signs    /64   Pulse 64   Temp 36.7 °C (98 °F)   Resp 17   Ht 5' 9\" (1.753 m)   Wt 102.5 kg (226 lb)   SpO2 100%   BMI 33.37 kg/m²     Patient is status post Procedure(s):  COLONOSCOPY  ENDOSCOPIC POLYPECTOMY. Nausea/Vomiting: Controlled. Postoperative hydration reviewed and adequate. Pain:  Pain Scale 1: Numeric (0 - 10) (04/03/23 0735)  Pain Intensity 1: 0 (04/03/23 0735)   Managed. Neurological Status: At baseline. Mental Status and Level of Consciousness: Arousable. Pulmonary Status:   O2 Device: None (Room air) (04/03/23 0735)   Adequate oxygenation and airway patent. Complications related to anesthesia: None    Post-anesthesia assessment completed. No concerns. Signed By: Yaneli Serrano MD    4/3/2023  Post anesthesia nausea and vomiting:  controlled      INITIAL Post-op Vital signs: No vitals data found for the desired time range.

## 2023-04-03 NOTE — DISCHARGE INSTRUCTIONS
Dinora Brown  [id number]  1950    COLON DISCHARGE INSTRUCTIONS  Discomfort:  Redness at IV site- apply warm compress to area; if redness or soreness persist- contact your physician  There may be a slight amount of blood passed from the rectum  Gaseous discomfort- walking, belching will help relieve any discomfort  You may not operate a vehicle for 12 hours  You may not engage in an occupation involving machinery or appliances for rest of today  You may not drink alcoholic beverages for at least 12 hours  Avoid making any critical decisions for at least 24 hour  DIET:   Regular diet. - however -  remember your colon is empty and a heavy meal will produce gas. Avoid these foods:  vegetables, fried / greasy foods, carbonated drinks for today. MEDICATIONS:        Regarding Aspirin or Nonsteroidal medications, please see below. ACTIVITY:  You may resume your normal daily activities it is recommended that you spend the remainder of the day resting -  avoid any strenuous activity. CALL M.D. ANY SIGN OF:  Increasing pain, nausea, vomiting  Abdominal distension (swelling)  New increased bleeding (oral or rectal)  Fever (chills)  Pain in chest area  Bloody discharge from nose or mouth  Shortness of breath  Tylenol as needed for pain. Findings:    Scope advanced to the cecum. Preparation was fair with some residual stool that was lavaged throughout the colon. The tattoo site in the proximal ascending colon was seen without any polyp tissue noted. Sessile 8 mm polyp in the distal ascending colon removed with cold snare using duck bill snare. Left sided diverticulosis.        Follow-up Instructions:   Call Dr. Buck Vasquez for results of procedure / biopsy in 4-5 days at telephone #  [phone]              Repeat Colonoscopy in 2 years    Patient Education on Sedation / Analgesia Administered for Procedure      For 24 hours after general anesthesia or intravenous analgesia / sedation:  Have someone responsible help you with your care  Limit your activities  Do not drive and operate hazardous machinery  Do not make important personal, legal or business decisions  Do not drink alcoholic beverages  If you have not urinated within 8 hours after discharge, please contact your physician  Resume your medications unless otherwise instructed    For 24 hours after general anesthesia or intravenous analgesia / sedation  you may experience:  Drowsiness, dizziness, sleepiness, or confusion  Difficulty remembering or delayed reaction times  Difficulty with your balance, especially while walking, move slowly and carefully, do not make sudden position changes  Difficulty focusing or blurred vision    You may not be aware of slight changes in your behavior and/or your reaction time because of the medication used during and after your procedure. Report the following to your physician:  Excessive pain, swelling, redness or odor of or around the surgical area  Temperature over 100.5  Nausea and vomiting lasting longer than 4 hours or if unable to take medications  Any signs of decreased circulation or nerve impairment to extremity: change in color, persistent numbness, tingling, coldness or increase pain  Any questions or concerns    IF YOU REPORT TO AN EMERGENCY ROOM, DOCTOR'S OFFICE OR HOSPITAL WITHIN 24 HOURS AFTER YOUR PROCEDURE, BRING THIS SHEET AND YOUR AFTER VISIT SUMMARY WITH YOU AND GIVE IT TO THE PHYSICIAN OR NURSE ATTENDING YOU. Learning About Diverticulosis and Diverticulitis  What are diverticulosis and diverticulitis? In diverticulosis and diverticulitis, pouches called diverticula form in the wall of the large intestine, or colon. In diverticulosis, the pouches do not cause any pain or other symptoms. In diverticulitis, the pouches get inflamed or infected and cause symptoms. Doctors aren't sure what causes these pouches in the colon. But they think that a low-fiber diet may play a role.  A low-fiber diet can cause small, hard stools. This means it takes more pressure in the colon to move stools out of the body. This puts more pressure on the walls of the colon. The pressure from this may cause pouches to form in weak spots along the colon. What are the symptoms? In diverticulosis, most people don't have symptoms. In diverticulitis, symptoms may last from a few hours to a week or more. They include:  Belly pain. This is usually in the lower left side. It is sometimes worse when you move. This is the most common symptom. Fever and chills. Bloating and gas. Diarrhea or constipation. Nausea and sometimes vomiting. Not feeling like eating. If the pouches bleed, it is called diverticular bleeding. How can you prevent diverticulitis? You may be able to lower your chance of getting diverticulitis. You can do this by taking steps to prevent constipation. Eat fruits, vegetables, beans, and whole grains every day. These foods are high in fiber. Drink plenty of fluids. If you have kidney, heart, or liver disease and have to limit fluids, talk with your doctor before you increase the amount of fluids you drink. Get at least 30 minutes of exercise on most days of the week. Walking is a good choice. Take a fiber supplement (such as Citrucel or Metamucil) every day if needed. Read and follow all instructions on the label. Schedule time each day for a bowel movement. Having a daily routine may help. Take your time and do not strain when having a bowel movement. How are these problems treated? The best way to treat diverticulosis is to avoid constipation. Treatment for diverticulitis includes antibiotics. It often includes a change in your diet. You may need only liquids at first. Your doctor may suggest medicines for pain or belly cramps. In some cases, surgery may be needed. Follow-up care is a key part of your treatment and safety.  Be sure to make and go to all appointments, and call your doctor if you are having problems. It's also a good idea to know your test results and keep a list of the medicines you take. Where can you learn more? Go to http://www.gray.com/  Enter X810 in the search box to learn more about \"Learning About Diverticulosis and Diverticulitis. \"  Current as of: June 6, 2022               Content Version: 13.6  © 2006-2023 [employer]. Care instructions adapted under license by [employer] (which disclaims liability or warranty for this information). If you have questions about a medical condition or this instruction, always ask your healthcare professional. [place] [address] any warranty or liability for your use of this information. Colon Polyps: Care Instructions  Your Care Instructions     Colon polyps are growths in the colon or the rectum. The cause of most colon polyps is not known, and most people who get them do not have any problems. But a certain kind can turn into cancer. For this reason, regular testing for colon polyps is important for people as they get older. It is also important for anyone who has an increased risk for colon cancer. Polyps are usually found through routine colon cancer screening tests. Although most colon polyps are not cancerous, they are usually removed and then tested for cancer. Screening for colon cancer saves lives because the cancer can usually be cured if it is caught early. If you have a polyp that is the type that can turn into cancer, you may need more tests to examine your entire colon. The doctor will remove any other polyps that are found, and you will be tested more often. Follow-up care is a key part of your treatment and safety. Be sure to make and go to all appointments, and call your doctor if you are having problems. It's also a good idea to know your test results and keep a list of the medicines you take. How can you care for yourself at home?   Regular exams to look for colon polyps are the best way to prevent polyps from turning into colon cancer. These can include stool tests, sigmoidoscopy, colonoscopy, and CT colonography. Talk with your doctor about a testing schedule that is right for you. To prevent polyps  There is no home treatment that can prevent colon polyps. But these steps may help lower your risk for cancer. Stay active. Being active can help you get to and stay at a healthy weight. Try to exercise on most days of the week. Walking is a good choice. Eat well. Choose a variety of vegetables, fruits, legumes (such as peas and beans), fish, poultry, and whole grains. Do not smoke. If you need help quitting, talk to your doctor about stop-smoking programs and medicines. These can increase your chances of quitting for good. If you drink alcohol, limit how much you drink. Limit alcohol to 2 drinks a day for men and 1 drink a day for women. When should you call for help? Call your doctor now or seek immediate medical care if:    You have severe belly pain. Your stools are maroon or very bloody. Watch closely for changes in your health, and be sure to contact your doctor if:    You have a fever. You have nausea or vomiting. You have a change in bowel habits (new constipation or diarrhea). Your symptoms get worse or are not improving as expected. Where can you learn more? Go to http://florida-meghan.info/  Enter C571 in the search box to learn more about \"Colon Polyps: Care Instructions. \"  Current as of: June 6, 2022               Content Version: 13.6  © 2006-2023 [employer]. Care instructions adapted under license by [employer] (which disclaims liability or warranty for this information).  If you have questions about a medical condition or this instruction, always ask your healthcare professional. [place] [address] any warranty or liability for your use of this information.

## 2023-04-03 NOTE — PERIOP NOTES
Endoscopy Case End Note:    0730:  Procedure scope was pre-cleaned, per protocol, at bedside by DWIGHT Peters. 0418:  Report received from anesthesia - Dr. Laverne Neves DO. See anesthesia flowsheet for intra-procedure vital signs and events.

## 2023-04-03 NOTE — PROCEDURES
Colonoscopy Procedure Note    Indications:   Personal history of colon polyps (screening only)    Referring Physician: Paramjit Hankins MD  Anesthesia/Sedation: MAC anesthesia Propofol  Endoscopist:  Dr. Paresh Meehan    Procedure in Detail:  Informed consent was obtained for the procedure, including sedation. Risks of perforation, hemorrhage, adverse drug reaction, and aspiration were discussed. The patient was placed in the left lateral decubitus position. Based on the pre-procedure assessment, including review of the patient's medical history, medications, allergies, and review of systems, he had been deemed to be an appropriate candidate for moderate sedation; he was therefore sedated with the medications listed above. The patient was monitored continuously with ECG tracing, pulse oximetry, blood pressure monitoring, and direct observations. A rectal examination was performed. The [id number] was inserted into the rectum and advanced under direct vision to the cecum, which was identified by the ileocecal valve and appendiceal orifice. The quality of the colonic preparation was fair. A careful inspection was made as the colonoscope was withdrawn, including a retroflexed view of the rectum; findings and interventions are described below. Appropriate photodocumentation was obtained. Findings:    Scope advanced to the cecum. Preparation was fair with some residual stool that was lavaged throughout the colon. The tattoo site in the proximal ascending colon was seen without any polyp tissue noted. Sessile 8 mm polyp in the distal ascending colon removed with cold snare using duck bill snare. Left sided diverticulosis. Therapies:  see above    Specimen: Specimens were collected as described above and sent to pathology. Complications: None were encountered during the procedure.      EBL: < 10 ml    Recommendations:   -Repeat Colonoscopy in 2 years    Thank you for entrusting me with this patient's care. Please do not hesitate to contact me with any questions or if I can be of assistance with any of your other patients' GI needs.   Signed By: Cindy Park MD                        April 3, 2023

## 2023-04-03 NOTE — ANESTHESIA PREPROCEDURE EVALUATION
Relevant Problems   ENDOCRINE   (+) Severe obesity (BMI 35.0-39. 9)       Anesthetic History   No history of anesthetic complications            Review of Systems / Medical History  Patient summary reviewed, nursing notes reviewed and pertinent labs reviewed    Pulmonary  Within defined limits                 Neuro/Psych   Within defined limits           Cardiovascular    Hypertension              Exercise tolerance: >4 METS  Comments: TTE (9/27/19): Left Ventricle: Normal cavity size and systolic function (ejection fraction normal). Mild concentric hypertrophy. Estimated left ventricular ejection fraction is 61 - 65%. No regional wall motion abnormality noted. Age-appropriate left ventricular diastolic function. ECG (9/12/19):   Sinus  Bradycardia  - occasional PAC     # PACs = 1.  WITHIN NORMAL LIMITS   GI/Hepatic/Renal               Comments: FamilyHx colon polyps Endo/Other      Hypothyroidism  Obesity     Other Findings              Physical Exam    Airway  Mallampati: III  TM Distance: > 6 cm  Neck ROM: normal range of motion   Mouth opening: Normal     Cardiovascular  Regular rate and rhythm,  S1 and S2 normal,  no murmur, click, rub, or gallop             Dental         Pulmonary  Breath sounds clear to auscultation               Abdominal  GI exam deferred       Other Findings            Anesthetic Plan    ASA: 2  Anesthesia type: total IV anesthesia          Induction: Intravenous  Anesthetic plan and risks discussed with: Patient

## 2023-04-03 NOTE — H&P
Gastroenterology Outpatient History and Physical    Patient: Dixie Stern    Physician: Vernell Patel MD    Vital Signs: Blood pressure (!) 193/83, pulse (!) 53, temperature 98 °F (36.7 °C), resp. rate 15, height 5' 9\" (1.753 m), weight 102.5 kg (226 lb), SpO2 98 %. Allergies: No Known Allergies    Chief Complaint: Personal h/o multiple colon polyps    History of Present Illness: above    Justification for Procedure: above    History:  Past Medical History:   Diagnosis Date    Essential hypertension     Thyroid disease       Past Surgical History:   Procedure Laterality Date    COLONOSCOPY N/A 4/7/2022    COLONOSCOPY performed by Onesimo Flores MD at [facility] ENDOSCOPY    HX HERNIA REPAIR        Social History     Socioeconomic History    Marital status:    Tobacco Use    Smoking status: Never    Smokeless tobacco: Former     Types: Chew     Quit date: 2010    Tobacco comments:     tobacco chew   Substance and Sexual Activity    Alcohol use: Not Currently     Alcohol/week: 2.0 standard drinks     Types: 2 Cans of beer per week     Comment: 2 beers a week    Drug use: No      Family History   Problem Relation Age of Onset    Arrhythmia Mother     Pacemaker Mother     Cancer Father     Heart Attack Brother     Cancer Brother     Stroke Brother        Medications:   Prior to Admission medications    Medication Sig Start Date End Date Taking? Authorizing Provider   cholecalciferol, vitamin D3, 50 mcg (2,000 unit) tab Take  by mouth daily. Yes Provider, Historical   levothyroxine (SYNTHROID) 75 mcg tablet Take  by mouth Daily (before breakfast). Provider, Historical   lisinopril-hydroCHLOROthiazide (PRINZIDE, ZESTORETIC) 10-12.5 mg per tablet Take  by mouth daily. Provider, Historical       Physical Exam:   General: alert, no distress   HEENT: Head: Normocephalic, no lesions, without obvious abnormality.    Heart: regular rate and rhythm, S1, S2 normal, no murmur, click, rub or gallop   Lungs: chest clear, no wheezing, rales, normal symmetric air entry   Abdominal: soft, NT/ND + BS   Neurological: Grossly normal   Extremities: extremities normal, atraumatic, no cyanosis or edema     Findings/Diagnosis: Colon Polyps    Plan of Care/Planned Procedure: Colonoscopy

## 2023-04-03 NOTE — ROUTINE PROCESS
Ingrid Guillen  1950  [id number]    Situation:  Verbal report received from: Lolis Sherwood  Procedure: Procedure(s):  COLONOSCOPY  ENDOSCOPIC POLYPECTOMY    Background:    Preoperative diagnosis: SCREENING  Postoperative diagnosis: diverticulosis, polyp    :  Dr. Rhodes Figures  Assistant(s): Scrub Tech-1: Ad Mcleod  Endoscopy RN-1: Purnima Serrano RN  Endoscopy RN-2: Carlos Khan RN    Specimens:   ID Type Source Tests Collected by Time Destination   1 : ascending colon polyp Preservative Colon, Ascending  Behzad Davis MD 4/3/2023 1861 Pathology     H. Pylori  no    Assessment:  Intra-procedure medications     Anesthesia gave intra-procedure sedation and medications, see anesthesia flow sheet yes    Intravenous fluids: NS@ KVO     Vital signs stable     Abdominal assessment: round and soft     Recommendation:  Discharge patient per MD order.   Family   Permission to share finding with family or friend yes

## 2023-05-22 RX ORDER — LEVOTHYROXINE SODIUM 0.07 MG/1
TABLET ORAL
COMMUNITY

## 2023-05-22 RX ORDER — LISINOPRIL AND HYDROCHLOROTHIAZIDE 12.5; 1 MG/1; MG/1
TABLET ORAL DAILY
COMMUNITY

## 2025-04-01 RX ORDER — SODIUM CHLORIDE 9 MG/ML
INJECTION, SOLUTION INTRAVENOUS PRN
Status: CANCELLED | OUTPATIENT
Start: 2025-04-01

## 2025-04-01 RX ORDER — SODIUM CHLORIDE 0.9 % (FLUSH) 0.9 %
5-40 SYRINGE (ML) INJECTION PRN
Status: CANCELLED | OUTPATIENT
Start: 2025-04-01

## 2025-04-01 RX ORDER — SODIUM CHLORIDE 0.9 % (FLUSH) 0.9 %
5-40 SYRINGE (ML) INJECTION EVERY 12 HOURS SCHEDULED
Status: CANCELLED | OUTPATIENT
Start: 2025-04-01

## 2025-04-02 ENCOUNTER — HOSPITAL ENCOUNTER (OUTPATIENT)
Facility: HOSPITAL | Age: 75
Setting detail: OUTPATIENT SURGERY
Discharge: HOME | End: 2025-04-02
Attending: SPECIALIST | Admitting: SPECIALIST
Payer: MEDICARE

## 2025-04-02 ENCOUNTER — ANESTHESIA (OUTPATIENT)
Facility: HOSPITAL | Age: 75
End: 2025-04-02
Payer: MEDICARE

## 2025-04-02 ENCOUNTER — ANESTHESIA EVENT (OUTPATIENT)
Facility: HOSPITAL | Age: 75
End: 2025-04-02
Payer: MEDICARE

## 2025-04-02 VITALS
RESPIRATION RATE: 16 BRPM | DIASTOLIC BLOOD PRESSURE: 72 MMHG | BODY MASS INDEX: 36.57 KG/M2 | HEIGHT: 69 IN | TEMPERATURE: 98 F | SYSTOLIC BLOOD PRESSURE: 143 MMHG | OXYGEN SATURATION: 95 % | WEIGHT: 246.9 LBS | HEART RATE: 52 BPM

## 2025-04-02 PROCEDURE — 3700000000 HC ANESTHESIA ATTENDED CARE: Performed by: SPECIALIST

## 2025-04-02 PROCEDURE — 7100000010 HC PHASE II RECOVERY - FIRST 15 MIN: Performed by: SPECIALIST

## 2025-04-02 PROCEDURE — 3700000001 HC ADD 15 MINUTES (ANESTHESIA): Performed by: SPECIALIST

## 2025-04-02 PROCEDURE — 6360000002 HC RX W HCPCS: Performed by: NURSE ANESTHETIST, CERTIFIED REGISTERED

## 2025-04-02 PROCEDURE — 3600007502: Performed by: SPECIALIST

## 2025-04-02 PROCEDURE — 3600007512: Performed by: SPECIALIST

## 2025-04-02 PROCEDURE — 2709999900 HC NON-CHARGEABLE SUPPLY: Performed by: SPECIALIST

## 2025-04-02 PROCEDURE — 88305 TISSUE EXAM BY PATHOLOGIST: CPT

## 2025-04-02 PROCEDURE — 2580000003 HC RX 258: Performed by: NURSE ANESTHETIST, CERTIFIED REGISTERED

## 2025-04-02 PROCEDURE — 7100000011 HC PHASE II RECOVERY - ADDTL 15 MIN: Performed by: SPECIALIST

## 2025-04-02 RX ORDER — LIDOCAINE HYDROCHLORIDE 20 MG/ML
INJECTION, SOLUTION EPIDURAL; INFILTRATION; INTRACAUDAL; PERINEURAL
Status: DISCONTINUED | OUTPATIENT
Start: 2025-04-02 | End: 2025-04-02 | Stop reason: SDUPTHER

## 2025-04-02 RX ORDER — SODIUM CHLORIDE 9 MG/ML
INJECTION, SOLUTION INTRAVENOUS
Status: DISCONTINUED | OUTPATIENT
Start: 2025-04-02 | End: 2025-04-02 | Stop reason: SDUPTHER

## 2025-04-02 RX ORDER — METFORMIN HYDROCHLORIDE 500 MG/1
TABLET, EXTENDED RELEASE ORAL
COMMUNITY
Start: 2025-03-13

## 2025-04-02 RX ADMIN — PROPOFOL 270 MG: 10 INJECTION, EMULSION INTRAVENOUS at 07:53

## 2025-04-02 RX ADMIN — LIDOCAINE HYDROCHLORIDE 50 MG: 20 INJECTION, SOLUTION EPIDURAL; INFILTRATION; INTRACAUDAL; PERINEURAL at 07:37

## 2025-04-02 RX ADMIN — SODIUM CHLORIDE: 9 INJECTION, SOLUTION INTRAVENOUS at 07:37

## 2025-04-02 ASSESSMENT — PAIN - FUNCTIONAL ASSESSMENT: PAIN_FUNCTIONAL_ASSESSMENT: 0-10

## 2025-04-02 NOTE — OP NOTE
Colonoscopy Procedure Note    Indications:   Personal history of colon polyps (screening only)    Referring Physician: Vivek Yap MD  Anesthesia/Sedation: MAC anesthesia Propofol  Endoscopist:  Dr. Quan Lee    Procedure in Detail:  Informed consent was obtained for the procedure, including sedation.  Risks of perforation, hemorrhage, adverse drug reaction, and aspiration were discussed. The patient was placed in the left lateral decubitus position.  Based on the pre-procedure assessment, including review of the patient's medical history, medications, allergies, and review of systems, he had been deemed to be an appropriate candidate for moderate sedation; he was therefore sedated with the medications listed above.   The patient was monitored continuously with ECG tracing, pulse oximetry, blood pressure monitoring, and direct observations.      A rectal examination was performed. The [id number] was inserted into the rectum and advanced under direct vision to the cecum, which was identified by the ileocecal valve and appendiceal orifice.  The quality of the colonic preparation was adequate.  A careful inspection was made as the colonoscope was withdrawn, including a retroflexed view of the rectum; findings and interventions are described below.  Appropriate photodocumentation was obtained.    Findings:    Scope advanced to the cecum.   Preparation was adequate.   Severe diffuse diverticulosis of sigmoid, descending and ascending colon.   Tattoo site seen in the ascending colon without any tissue regrowth.   Sessile 6 mm polyp in the descending colon at 35 cm s/p cold snare removal.    Therapies:  see above    Specimen: Specimens were collected as described above and sent to pathology.     Complications: None were encountered during the procedure.     EBL: < 10 ml.    Recommendations:     - Repeat colonoscopy in 3 years.    Signed By: Quan Lee MD                        April 2, 2025

## 2025-04-02 NOTE — ANESTHESIA POSTPROCEDURE EVALUATION
Department of Anesthesiology  Postprocedure Note    Patient: Patrick Yanez  MRN: [id number]  YOB: 1950  Date of evaluation: 4/2/2025    Procedure Summary       Date: 04/02/25 Room / Location: [facility] ENDO 02 / [facility] ENDOSCOPY    Anesthesia Start: 0730 Anesthesia Stop: 0759    Procedure: COLONOSCOPY (Lower GI Region) Diagnosis:       Family hx colonic polyps      Screening for colon cancer      (Family hx colonic polyps [Z83.719])      (Screening for colon cancer [Z12.11])    Surgeons: Quan Lee MD Responsible Provider: Arnaud Watson MD    Anesthesia Type: MAC ASA Status: 2            Anesthesia Type: MAC    Anatoliy Phase I: Anatoliy Score: 10    Anatoliy Phase II:      Anesthesia Post Evaluation    Patient location during evaluation: PACU  Patient participation: complete - patient participated  Level of consciousness: sleepy but conscious and responsive to verbal stimuli  Pain score: 1  Airway patency: patent  Nausea & Vomiting: no vomiting and no nausea  Cardiovascular status: blood pressure returned to baseline and hemodynamically stable  Respiratory status: acceptable  Hydration status: stable  Multimodal analgesia pain management approach  Pain management: adequate    No notable events documented.

## 2025-04-02 NOTE — PERIOP NOTE
ARRIVAL INFORMATION:  Verified patient name and date of birth, scheduled procedure, and informed consent.     : Tena wilson contact number: [phone]  Physician and staff can share information with the .     Receive texts: yes    Belongings with patient include:  Clothing,None    GI FOCUSED ASSESSMENT:  Neuro: Awake, alert, oriented x4  Respiratory: even and unlabored   GI: soft and non-distended  EKG Rhythm: normal sinus rhythm    Education:Reviewed general discharge instructions and  information.      Scope pre cleaned by Cathy BOYD

## 2025-04-02 NOTE — ANESTHESIA PRE PROCEDURE
Department of Anesthesiology  Preprocedure Note       Name:  Patrick Yanez   Age:  75 y.o.  :  1950                                          MRN:  [id number]         Date:  2025      Surgeon: Surgeon(s):  Quan Lee MD    Procedure: Procedure(s):  COLONOSCOPY    Medications prior to admission:   Prior to Admission medications    Medication Sig Start Date End Date Taking? Authorizing Provider   Cholecalciferol 50 MCG ( UT) TABS Take by mouth daily    Automatic Reconciliation, Ar   levothyroxine (SYNTHROID) 75 MCG tablet Take by mouth every morning (before breakfast)    Automatic Reconciliation, Ar   lisinopril-hydroCHLOROthiazide (PRINZIDE;ZESTORETIC) 10-12.5 MG per tablet Take by mouth daily    Automatic Reconciliation, Ar       Current medications:    No current facility-administered medications for this encounter.     Current Outpatient Medications   Medication Sig Dispense Refill   • Cholecalciferol 50 MCG ( UT) TABS Take by mouth daily     • levothyroxine (SYNTHROID) 75 MCG tablet Take by mouth every morning (before breakfast)     • lisinopril-hydroCHLOROthiazide (PRINZIDE;ZESTORETIC) 10-12.5 MG per tablet Take by mouth daily         Allergies:  Not on File    Problem List:  There is no problem list on file for this patient.      Past Medical History:        Diagnosis Date   • Essential hypertension    • Thyroid disease        Past Surgical History:        Procedure Laterality Date   • COLONOSCOPY N/A 2022    COLONOSCOPY performed by Quan Lee MD at [facility] ENDOSCOPY   • COLONOSCOPY N/A 4/3/2023    COLONOSCOPY performed by Quan Lee MD at [facility] ENDOSCOPY   • HERNIA REPAIR         Social History:    Social History     Tobacco Use   • Smoking status: Never   • Smokeless tobacco: Former     Quit date: 2010   • Tobacco comments:     Quit smoking: tobacco chew   Substance Use Topics   • Alcohol use: Not Currently     Alcohol/week: 2.0 standard drinks of alcohol

## 2025-04-02 NOTE — H&P
Gastroenterology Outpatient History and Physical    Patient: Patrick Yanez    Physician: Quan Lee MD    Vital Signs: Blood pressure (!) 197/100, pulse 61, temperature 98.1 °F (36.7 °C), temperature source Temporal, resp. rate 20, height 1.753 m (5' 9\"), weight 112 kg (246 lb 14.4 oz), SpO2 99%.    Allergies: No Known Allergies    Chief Complaint: Personal h/o colon polyps    History of Present Illness: above    Justification for Procedure: above    History:  Past Medical History:   Diagnosis Date    Essential hypertension     Thyroid disease       Past Surgical History:   Procedure Laterality Date    COLONOSCOPY N/A 4/7/2022    COLONOSCOPY performed by Quan Lee MD at \[facility]\"" ENDOSCOPY    COLONOSCOPY N/A 4/3/2023    COLONOSCOPY performed by Quan Lee MD at \[facility]\"" ENDOSCOPY    HERNIA REPAIR        Social History     Socioeconomic History    Marital status:      Spouse name: None    Number of children: None    Years of education: None    Highest education level: None   Tobacco Use    Smoking status: Never    Smokeless tobacco: Former     Quit date: 1/1/2010    Tobacco comments:     Quit smoking: tobacco chew   Substance and Sexual Activity    Alcohol use: Not Currently     Alcohol/week: 2.0 standard drinks of alcohol    Drug use: No      Family History   Problem Relation Age of Onset    Arrhythmia Mother     Pacemaker Mother     Cancer Father     Stroke Brother     Cancer Brother     Heart Attack Brother     Colon Cancer Brother        Medications:   Prior to Admission medications    Medication Sig Start Date End Date Taking? Authorizing Provider   metFORMIN (GLUCOPHAGE-XR) 500 MG extended release tablet Take 2 tablets every day with evening meal 3/13/25  Yes Provider, MD Gagan   Cholecalciferol 50 MCG (2000 UT) TABS Take by mouth daily   Yes Automatic Reconciliation, Ar   lisinopril-hydroCHLOROthiazide (PRINZIDE;ZESTORETIC) 10-12.5 MG per tablet Take by mouth daily   Yes Automatic  Reconciliation, Ar   levothyroxine (SYNTHROID) 75 MCG tablet Take by mouth every morning (before breakfast)    Automatic Reconciliation, Ar       Physical Exam:   General: NAD   HEENT: Head: Normocephalic, no lesions, without obvious abnormality.   Heart: regular rate and rhythm, S1, S2 normal, no murmur, click, rub or gallop   Lungs: chest clear, no wheezing, rales, normal symmetric air entry   Abdominal: soft, NT/ND   Neurological: Grossly normal   Extremities: extremities normal, atraumatic, no cyanosis or edema     Findings/Diagnosis: H/O colon polyps    Plan of Care/Planned Procedure: Colonoscopy

## 2025-04-02 NOTE — DISCHARGE INSTRUCTIONS
Patrick Yanez  [id number]  1950    COLON DISCHARGE INSTRUCTIONS  Discomfort:  Redness at IV site- apply warm compress to area; if redness or soreness persist- contact your physician  There may be a slight amount of blood passed from the rectum  Gaseous discomfort- walking, belching will help relieve any discomfort  You may not operate a vehicle for 12 hours  You may not engage in an occupation involving machinery or appliances for rest of today  You may not drink alcoholic beverages for at least 12 hours  Avoid making any critical decisions for at least 24 hour  DIET:   Regular diet.   - however -  remember your colon is empty and a heavy meal will produce gas.   Avoid these foods:  vegetables, fried / greasy foods, carbonated drinks for today.    MEDICATIONS:        Regarding Aspirin or Nonsteroidal medications, please see below.    ACTIVITY:  You may resume your normal daily activities it is recommended that you spend the remainder of the day resting -  avoid any strenuous activity.    CALL M.D.  ANY SIGN OF:  Increasing pain, nausea, vomiting  Abdominal distension (swelling)  New increased bleeding (oral or rectal)  Fever (chills)  Pain in chest area  Bloody discharge from nose or mouth  Shortness of breath    Tylenol as needed for pain.      Follow-up Instructions:   Call Dr. Lee for results of procedure / biopsy in 4-5 days at telephone #  [phone]              Repeat Colonoscopy in 3 years    Findings:  (1) polyp removed  Diverticulosis    Patient Education on Sedation / Analgesia Administered for Procedure      For 24 hours after general anesthesia or intravenous analgesia / sedation:  Have someone responsible help you with your care  Limit your activities  Do not drive and operate hazardous machinery  Do not make important personal, legal or business decisions  Do not drink alcoholic beverages  If you have not urinated within 8 hours after discharge, please contact your physician  Resume your

## (undated) DEVICE — CONTAINER SPEC 20 ML LID NEUT BUFF FORMALIN 10 % POLYPR STS

## (undated) DEVICE — SOLIDIFIER FLD 2OZ 1500CC N DISINF IN BTL DISP SAFESORB

## (undated) DEVICE — NEONATAL-ADULT SPO2 SENSOR: Brand: NELLCOR

## (undated) DEVICE — SET GRAV CK VLV NEEDLESS ST 3 GANGED 4WAY STPCOCK HI FLO 10

## (undated) DEVICE — SYR 10ML LUER LOK 1/5ML GRAD --

## (undated) DEVICE — 1200 GUARD II KIT W/5MM TUBE W/O VAC TUBE: Brand: GUARDIAN

## (undated) DEVICE — CATH IV AUTOGRD BC PNK 20GA 25 -- INSYTE

## (undated) DEVICE — Device

## (undated) DEVICE — BASIN EMSIS 16OZ GRAPHITE PLAS KID SHP MOLD GRAD FOR ORAL

## (undated) DEVICE — SYR 3ML LL TIP 1/10ML GRAD --

## (undated) DEVICE — TOWEL 4 PLY TISS 19X30 SUE WHT

## (undated) DEVICE — RESOLUTION CLIP

## (undated) DEVICE — NON-REM POLYHESIVE PATIENT RETURN ELECTRODE: Brand: VALLEYLAB

## (undated) DEVICE — HYPODERMIC SAFETY NEEDLE: Brand: MAGELLAN

## (undated) DEVICE — ELECTRODE,RADIOTRANSLUCENT,FOAM,5PK: Brand: MEDLINE

## (undated) DEVICE — TRAP,MUCUS SPECIMEN, 80CC: Brand: MEDLINE

## (undated) DEVICE — IV START KIT: Brand: MEDLINE

## (undated) DEVICE — ACUSNARE POLYPECTOMY SNARE: Brand: ACUSNARE

## (undated) DEVICE — Z DISCONTINUED PER MEDLINE LINE GAS SAMPLING O2/CO2 LNG AD 13 FT NSL W/ TBNG FILTERLINE

## (undated) DEVICE — CUFF BLD PRSS AD CLTH SGL TB W/ BAYNT CONN ROUNDED CORNER

## (undated) DEVICE — TIP SUCT TRNSPAR RIB SURF STD BLB RIG NVENT W/ 5IN1 CONN DYND50138] MEDLINE INDUSTRIES INC]

## (undated) DEVICE — STAIN INDIA INK IN NACL 10ML --

## (undated) DEVICE — NEEDLE SCLERO 25GA L240CM OD0.51MM ID0.24MM EXTN L4MM SHTH

## (undated) DEVICE — RETRIEVER ENDOSCP L230CM DIA2.5MM NET W3XL5.5CM MIN WRK CHN

## (undated) DEVICE — ENDOSCOPIC KIT COMPLIANCE ENDOKIT

## (undated) DEVICE — TRAP SPEC POLYP REM STRNR CLN DSGN MAGNIFYING WIND DISP

## (undated) DEVICE — SET ADMIN 16ML TBNG L100IN 2 Y INJ SITE IV PIGGY BK DISP

## (undated) DEVICE — SNARE ENDOSCP L240CM LOOP W27MM SHTH DIA2.4MM WRK CHN 2.8MM

## (undated) DEVICE — STRAINER URIN CALC RNL MSH -- CONVERT TO ITEM 357634

## (undated) DEVICE — SNARE ENDOSCP M L240CM W27MM SHTH DIA2.4MM CHN 2.8MM OVL

## (undated) DEVICE — SET ADMIN 16ML TBNG L100IN 2 Y INJ SITE IV PIGGY BK DISP (ORDER IN MULIPLES OF 48)